# Patient Record
Sex: MALE | Race: WHITE | NOT HISPANIC OR LATINO | Employment: UNEMPLOYED | URBAN - METROPOLITAN AREA
[De-identification: names, ages, dates, MRNs, and addresses within clinical notes are randomized per-mention and may not be internally consistent; named-entity substitution may affect disease eponyms.]

---

## 2022-04-13 ENCOUNTER — HOSPITAL ENCOUNTER (EMERGENCY)
Age: 22
Discharge: HOME | End: 2022-04-13
Attending: EMERGENCY MEDICINE

## 2022-04-13 VITALS
HEART RATE: 84 BPM | TEMPERATURE: 97.2 F | DIASTOLIC BLOOD PRESSURE: 72 MMHG | SYSTOLIC BLOOD PRESSURE: 133 MMHG | RESPIRATION RATE: 15 BRPM | OXYGEN SATURATION: 96 %

## 2022-04-13 DIAGNOSIS — F15.10 METHAMPHETAMINE ABUSE (CMD): Primary | ICD-10-CM

## 2022-04-13 PROCEDURE — 99282 EMERGENCY DEPT VISIT SF MDM: CPT

## 2022-04-13 PROCEDURE — 99283 EMERGENCY DEPT VISIT LOW MDM: CPT | Performed by: EMERGENCY MEDICINE

## 2022-04-13 ASSESSMENT — ENCOUNTER SYMPTOMS
ABDOMINAL PAIN: 0
NAUSEA: 0
WOUND: 1
CHILLS: 0
EYE PAIN: 0
PHOTOPHOBIA: 0
WEAKNESS: 0
SPEECH DIFFICULTY: 0
SORE THROAT: 0
FEVER: 0
SHORTNESS OF BREATH: 0
CONFUSION: 0
POLYDIPSIA: 0
NUMBNESS: 0
ADENOPATHY: 0
COUGH: 0
DIZZINESS: 0
DIARRHEA: 0
HEADACHES: 0
BACK PAIN: 0
ABDOMINAL DISTENTION: 0
CHEST TIGHTNESS: 0
BRUISES/BLEEDS EASILY: 0
VOMITING: 0

## 2024-10-29 ENCOUNTER — APPOINTMENT (OUTPATIENT)
Dept: URBAN - METROPOLITAN AREA CLINIC 246 | Age: 24
Setting detail: DERMATOLOGY
End: 2024-10-29

## 2024-10-29 DIAGNOSIS — L05.91 PILONIDAL CYST WITHOUT ABSCESS: ICD-10-CM

## 2024-10-29 PROCEDURE — OTHER DEFER: OTHER

## 2024-10-29 PROCEDURE — 99202 OFFICE O/P NEW SF 15 MIN: CPT

## 2024-10-29 PROCEDURE — OTHER COUNSELING: OTHER

## 2024-10-29 PROCEDURE — OTHER MIPS QUALITY: OTHER

## 2024-10-29 ASSESSMENT — LOCATION SIMPLE DESCRIPTION DERM: LOCATION SIMPLE: LEFT LOWER BACK

## 2024-10-29 ASSESSMENT — LOCATION ZONE DERM: LOCATION ZONE: TRUNK

## 2024-10-29 ASSESSMENT — LOCATION DETAILED DESCRIPTION DERM: LOCATION DETAILED: LEFT INFERIOR MEDIAL LOWER BACK

## 2024-10-29 NOTE — PROCEDURE: DEFER
Size Of Lesion In Cm (Optional): 3.5
Reason To Defer Override: refer to general surgeon
Detail Level: Detailed
X Size Of Lesion In Cm (Optional): 4
Introduction Text (Please End With A Colon): ;